# Patient Record
Sex: FEMALE | Race: WHITE | NOT HISPANIC OR LATINO
[De-identification: names, ages, dates, MRNs, and addresses within clinical notes are randomized per-mention and may not be internally consistent; named-entity substitution may affect disease eponyms.]

---

## 2019-04-08 ENCOUNTER — APPOINTMENT (OUTPATIENT)
Dept: OTOLARYNGOLOGY | Facility: CLINIC | Age: 54
End: 2019-04-08
Payer: COMMERCIAL

## 2019-04-08 DIAGNOSIS — Z82.49 FAMILY HISTORY OF ISCHEMIC HEART DISEASE AND OTHER DISEASES OF THE CIRCULATORY SYSTEM: ICD-10-CM

## 2019-04-08 DIAGNOSIS — M54.2 CERVICALGIA: ICD-10-CM

## 2019-04-08 PROCEDURE — 99213 OFFICE O/P EST LOW 20 MIN: CPT

## 2019-04-08 NOTE — HISTORY OF PRESENT ILLNESS
[de-identified] : She comes in today complaining of "swelling and neck, limiting neck range of motion, recurrent dizziness/vertigo".\par \par She visited me a few months ago with a classic BPPV. I sent her for vestibular rehabilitation and reposition maneuvers and she reports that she had a very good outcome.\par \par At this point she reports to me she's had several episodes of feeling significant posterior neck discomfort. This is associated with radiation of tingling down her arms and legs. She also developed shortness of breath. She feels that her throat is closing up.

## 2019-04-08 NOTE — ASSESSMENT
[FreeTextEntry1] : I explained to her that I believe she has primary musculoskeletal pathology. Associated with this is clear evidence of anxiety attacks. I also believe she has a migrainous component with this and she is also complaining of intermittent motion sickness while in a car.\par \par I have referred her for a primary care physician workup as well as to any physical medicine and rehabilitation Dr. triplett

## 2019-04-10 ENCOUNTER — APPOINTMENT (OUTPATIENT)
Dept: PHYSICAL MEDICINE AND REHAB | Facility: CLINIC | Age: 54
End: 2019-04-10
Payer: COMMERCIAL

## 2019-04-10 VITALS
WEIGHT: 175 LBS | BODY MASS INDEX: 29.88 KG/M2 | SYSTOLIC BLOOD PRESSURE: 129 MMHG | DIASTOLIC BLOOD PRESSURE: 92 MMHG | HEART RATE: 89 BPM | HEIGHT: 64 IN

## 2019-04-10 DIAGNOSIS — M54.2 CERVICALGIA: ICD-10-CM

## 2019-04-10 DIAGNOSIS — M79.9 SOFT TISSUE DISORDER, UNSPECIFIED: ICD-10-CM

## 2019-04-10 DIAGNOSIS — M62.838 OTHER MUSCLE SPASM: ICD-10-CM

## 2019-04-10 DIAGNOSIS — G89.29 CERVICALGIA: ICD-10-CM

## 2019-04-10 DIAGNOSIS — M25.512 PAIN IN LEFT SHOULDER: ICD-10-CM

## 2019-04-10 DIAGNOSIS — M43.6 TORTICOLLIS: ICD-10-CM

## 2019-04-10 PROCEDURE — 99244 OFF/OP CNSLTJ NEW/EST MOD 40: CPT

## 2019-04-10 NOTE — ASSESSMENT
[FreeTextEntry1] : \par PLAN AND RECOMMENDATIONS :\par \par We discussed differential diagnosis and clinical impression\par \par Recommend\par .symptomatic care and support\par  medications -try lidoderm  patches prescribed \par  imaging  deserves xrays c spine \par  referral to\par  hydrotherapy /heat / cold for pain\par  \par  relative rest and avoidance of painful activity where possible \par  \par  return for follow up after xray \par consider PT and TP injections \par we discussed posture -given hand out \par

## 2019-04-10 NOTE — HISTORY OF PRESENT ILLNESS
[FreeTextEntry1] : Ms. ANNALEE LOZANO is a very pleasant 54 year female who comes in for evaluation of neck pain that has been ongoing for >2 years  without any specific injury or inciting event ,however she believes she suffered a bad strain from a strap of bathing suit around her neck when on a trip in [place]  She has had vertigo last year and often wakes up with neck stiffness . She admits to a lot of family stress last few months The pain is located primarily post neck  intermittent in nature and described as stiff sharp -occasionally with dizzyness  . The pain is rated as 8/10 during today's visit, and ranges from 0-8/10.  Yesterday the pain was so severe she could not move her left arm  .The patient's symptoms are aggravated by stress and waking up/getting out of bed  and alleviated by ? nothing  . The patient has had radiating symptoms to the upper extremities but not recently , numbness/tingling, weakness, or bowel/bladder dysfunction. The patient has no other complaints at this time.\par

## 2019-04-10 NOTE — CONSULT LETTER
[FreeTextEntry1] : Dear Dr. Gay\par \par I had the pleasure of evaluating your patient, ANNALEE LOZANO .\par \par Thank you very much for allowing me to participate in the care of this patient. If you have any questions, please do not hesitate to contact me. \par \par Sincerely, \par Aurelio Chapa MD \par \par ABPMR Board Certified in Physical Medicine and Rehabilitation\par Certified Fellow of AANEM (Neuromuscular and Electrodiagnostic Medicine)\par Subspecialty certified in Sports Medicine (ABPMR)\par \par  of Physical Medicine and Rehabilitation\par [facility] [employer] [facility]\par [facility]\par \par

## 2019-04-10 NOTE — REVIEW OF SYSTEMS
[Joint Stiffness] : joint stiffness [Headache] : headache [Muscle Pain] : muscle pain [Dizziness] : dizziness [Anxiety] : anxiety [Negative] : Heme/Lymph

## 2019-06-25 ENCOUNTER — APPOINTMENT (OUTPATIENT)
Dept: OTOLARYNGOLOGY | Facility: CLINIC | Age: 54
End: 2019-06-25
Payer: COMMERCIAL

## 2019-06-25 DIAGNOSIS — H92.09 OTALGIA, UNSPECIFIED EAR: ICD-10-CM

## 2019-06-25 DIAGNOSIS — H90.3 SENSORINEURAL HEARING LOSS, BILATERAL: ICD-10-CM

## 2019-06-25 PROCEDURE — 99213 OFFICE O/P EST LOW 20 MIN: CPT

## 2019-06-25 PROCEDURE — 92567 TYMPANOMETRY: CPT

## 2019-06-25 PROCEDURE — 92557 COMPREHENSIVE HEARING TEST: CPT

## 2019-06-25 RX ORDER — LIDOCAINE 5% 5 G/100G
5 CREAM TOPICAL EVERY 8 HOURS
Qty: 2 | Refills: 1 | Status: COMPLETED | COMMUNITY
Start: 2019-04-10 | End: 2019-06-25

## 2019-06-25 NOTE — ASSESSMENT
[FreeTextEntry1] : New onset of right-sided unilateral idiopathic tinnitus. 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 A compare this to a recent audiogram and is stable.\par Because of the unilateral tinnitus and recent vestibular symptoms she will be sent for an MRI with contrast to vrule out a retro- cochlear lesion.

## 2019-06-25 NOTE — HISTORY OF PRESENT ILLNESS
[de-identified] : She comes in today complaining of right-sided tinnitus. This recently developed.\par She had seen me several months ago for vestibular symptoms. She benefited from repositioning maneuvers as well as vestibular rehabilitation.\par The vestibular symptoms have resolved.\par She does not report as change in hearing.

## 2019-07-14 ENCOUNTER — FORM ENCOUNTER (OUTPATIENT)
Age: 54
End: 2019-07-14

## 2019-07-15 ENCOUNTER — APPOINTMENT (OUTPATIENT)
Dept: MRI IMAGING | Facility: CLINIC | Age: 54
End: 2019-07-15

## 2019-07-15 ENCOUNTER — TRANSCRIPTION ENCOUNTER (OUTPATIENT)
Age: 54
End: 2019-07-15

## 2019-07-15 ENCOUNTER — OUTPATIENT (OUTPATIENT)
Dept: OUTPATIENT SERVICES | Facility: HOSPITAL | Age: 54
LOS: 1 days | End: 2019-07-15

## 2019-07-18 ENCOUNTER — APPOINTMENT (OUTPATIENT)
Dept: OTOLARYNGOLOGY | Facility: CLINIC | Age: 54
End: 2019-07-18
Payer: COMMERCIAL

## 2019-07-18 DIAGNOSIS — H93.11 TINNITUS, RIGHT EAR: ICD-10-CM

## 2019-07-18 PROCEDURE — 99213 OFFICE O/P EST LOW 20 MIN: CPT

## 2019-07-18 NOTE — ASSESSMENT
[FreeTextEntry1] : Idiopathic unilateral tinnitus. I explained her the pathophysiology of the problem. This is likely from a high-frequency hearing loss that is not being noticed in either E. or but in particular the right ear.\par I have asked her to avoid caffeine and alcohol. She will be cardiogram. We talk about the acceptance and potential biofeedback therapy. I gave her a copy of her MRI report.

## 2019-07-18 NOTE — HISTORY OF PRESENT ILLNESS
[de-identified] : She has had right-sided tinnitus. She also had a similar symptoms so as a result the center for an MRI to rule out a retrocochlear lesion. MRI was negative.\par She continues to complain of right-sided tinnitus.

## 2020-01-30 RX ORDER — AMOXICILLIN AND CLAVULANATE POTASSIUM 875; 125 MG/1; MG/1
875-125 TABLET, COATED ORAL
Qty: 20 | Refills: 1 | Status: ACTIVE | COMMUNITY
Start: 2020-01-30 | End: 1900-01-01

## 2021-03-20 ENCOUNTER — APPOINTMENT (OUTPATIENT)
Age: 56
End: 2021-03-20
Payer: COMMERCIAL

## 2021-03-20 PROCEDURE — 0001A: CPT

## 2021-03-22 ENCOUNTER — RESULT REVIEW (OUTPATIENT)
Age: 56
End: 2021-03-22

## 2021-03-29 ENCOUNTER — APPOINTMENT (OUTPATIENT)
Dept: RHEUMATOLOGY | Facility: CLINIC | Age: 56
End: 2021-03-29
Payer: COMMERCIAL

## 2021-03-29 ENCOUNTER — LABORATORY RESULT (OUTPATIENT)
Age: 56
End: 2021-03-29

## 2021-03-29 VITALS
SYSTOLIC BLOOD PRESSURE: 152 MMHG | OXYGEN SATURATION: 99 % | TEMPERATURE: 97.6 F | HEART RATE: 76 BPM | BODY MASS INDEX: 30.73 KG/M2 | HEIGHT: 64 IN | WEIGHT: 180 LBS | DIASTOLIC BLOOD PRESSURE: 87 MMHG

## 2021-03-29 DIAGNOSIS — M67.88 OTHER SPECIFIED DISORDERS OF SYNOVIUM AND TENDON, OTHER SITE: ICD-10-CM

## 2021-03-29 DIAGNOSIS — R70.0 ELEVATED ERYTHROCYTE SEDIMENTATION RATE: ICD-10-CM

## 2021-03-29 DIAGNOSIS — M76.60 ACHILLES TENDINITIS, UNSPECIFIED LEG: ICD-10-CM

## 2021-03-29 PROCEDURE — 36415 COLL VENOUS BLD VENIPUNCTURE: CPT

## 2021-03-29 PROCEDURE — 99072 ADDL SUPL MATRL&STAF TM PHE: CPT

## 2021-03-29 PROCEDURE — 99244 OFF/OP CNSLTJ NEW/EST MOD 40: CPT | Mod: 25

## 2021-03-30 LAB
CRP SERPL-MCNC: 5 MG/L
ERYTHROCYTE [SEDIMENTATION RATE] IN BLOOD BY WESTERGREN METHOD: 24 MM/HR
RHEUMATOID FACT SER QL: 16 IU/ML

## 2021-03-30 NOTE — CONSULT LETTER
[Dear  ___] : Dear  [unfilled], [Consult Letter:] : I had the pleasure of evaluating your patient, [unfilled]. [Please see my note below.] : Please see my note below. [Consult Closing:] : Thank you very much for allowing me to participate in the care of this patient.  If you have any questions, please do not hesitate to contact me. [Sincerely,] : Sincerely, [FreeTextEntry3] : Maggy Blandon MD, MPH

## 2021-03-30 NOTE — ASSESSMENT
[FreeTextEntry1] : 56 year old woman referred for rheumatology evaluation.  Patient noted to have elevated ESR, CRP and Rheumatoid factor in the setting of bilateral ankle pain secondary to Achilles tendinosis.  Patient feels tendinosis maybe related to quinolone antibiotic use and was previously following with orthopedist at [facility], pending PRP treatment prior to current coronavirus pandemic.  Patient will make appointment to follow up.,  At this time, it is unclear of current condition related to an underlying inflammatory arthropathy, although les s likely  Rheumatoid factor present in the past as well at about the same level.  Will repeat and check anti CCP as well.  Given tendinosis and involvement of the Achilles, will also check HLA B27, ESR, and CRP as well.  Given eye symptoms, will check anti SSa and Anti SSb, although given negative LENNOX less likely positive.  Will also check lyme antibody.  further management pending results.

## 2021-03-30 NOTE — PHYSICAL EXAM
[General Appearance - Alert] : alert [General Appearance - In No Acute Distress] : in no acute distress [General Appearance - Well Nourished] : well nourished [General Appearance - Well-Appearing] : healthy appearing [Sclera] : the sclera and conjunctiva were normal [Respiration, Rhythm And Depth] : normal respiratory rhythm and effort [Exaggerated Use Of Accessory Muscles For Inspiration] : no accessory muscle use [No Spinal Tenderness] : no spinal tenderness [Nail Clubbing] : no clubbing  or cyanosis of the fingernails [Skin Turgor] : normal skin turgor [] : no rash [Skin Lesions] : no skin lesions [Oriented To Time, Place, And Person] : oriented to person, place, and time [Impaired Insight] : insight and judgment were intact [Affect] : the affect was normal [FreeTextEntry1] : enlargement of the bilateral achilles, right more than left.  No active synovitis of the upper and lower extremities bilaterally.

## 2021-03-30 NOTE — HISTORY OF PRESENT ILLNESS
[FreeTextEntry1] : 56 year old woman referred for rheumatology evaluation\par PMD: Marylee Dilling\par \par Patient feels had a series of medical issues which are unclear if related.\par Was seen by PMD and had blood tests completed, some numbers slightly elevated\par \par RF 20\par ESR 35\par CRP 6\par LENNOX neg\par \par Patient reports about 12 years ago, after wine and sushi\par Woke up in the middle of the night, felt as though everything hurting and swollen\par Everything hurt when tried to step up out of the bed\par Everything was swollen lower extremities and hands, hard to move and joints felt as though popping\par Went into the doctor same day for evaluation, labs with elevated RF, ESR, CRP\par Treated with steroids at that time and symptoms resolved.  \par \par In September 2017 after summer vacation in [place]\par Developed a sinus infection, treated with levaquin\par Ankles started hurting initially thought plantar fasciitis\par Had positive RF of 20 in past\par In November 2017 had additional sinus infection, seen by ENT, treated with abx and steroids\par Also with bilateral ankle and achilles pain, went to foot and ankle, referred to PT \par Saw specialist at [facility], possible related to levaquin\par Recommended trial of PRP but due to covid postponed. \par \par also developed Vertigo about two years ago Bppv treated an improved with vestibular therapy, \par Vertigo turned into tinnitus\par \par Eye exam pressures elevated in eyes\par No history of glaucoma\par Mother with cataracts\par Referred to specialist for glaucoma specialist, "cornea are thick" false pressure of glaucoma\par at risk for narrow angle\par \par No rashes, no history of psoriasis\par No oral ulcers\par No low back pain\par no morning stiffness except in bilateral ankles\par +weight gain as decreased exercise\par Foot pain is quite limiting, limits daily activities, unable to walk as previously.\par Had multiple MRI by orthopedist\par \par MRI 8/2019 right foot\par chronic achilles tendinosis without tear\par mild posterior tibial tenosynovitis and insertional tendinosis without tear\par Mild proximal central cord fasciopathy\par Similar to 2018 \par \par MRI left foot 8/2019\par Chronic achilles tendinosis without tear\par mild posterior tibial tenosynovitis \par Mild proximal central cord fasciopathy\par Similar to 2018

## 2021-03-31 LAB
CCP AB SER IA-ACNC: <8 UNITS
ENA SS-A AB SER IA-ACNC: <0.2 AL
ENA SS-B AB SER IA-ACNC: <0.2 AL
RF+CCP IGG SER-IMP: NEGATIVE

## 2021-04-01 LAB — B BURGDOR IGG+IGM SER QL IB: NORMAL

## 2021-04-05 LAB — HLA-B27 RELATED AG QL: NEGATIVE

## 2021-05-26 ENCOUNTER — APPOINTMENT (OUTPATIENT)
Dept: CT IMAGING | Facility: CLINIC | Age: 56
End: 2021-05-26
Payer: SELF-PAY

## 2021-05-26 PROCEDURE — 75571 CT HRT W/O DYE W/CA TEST: CPT | Mod: 26

## 2021-11-16 ENCOUNTER — APPOINTMENT (OUTPATIENT)
Dept: ORTHOPEDIC SURGERY | Facility: CLINIC | Age: 56
End: 2021-11-16
Payer: COMMERCIAL

## 2021-11-16 VITALS — BODY MASS INDEX: 31.2 KG/M2 | WEIGHT: 185 LBS | HEIGHT: 64.5 IN

## 2021-11-16 DIAGNOSIS — M23.90 UNSPECIFIED INTERNAL DERANGEMENT OF UNSPECIFIED KNEE: ICD-10-CM

## 2021-11-16 DIAGNOSIS — M25.562 PAIN IN LEFT KNEE: ICD-10-CM

## 2021-11-16 PROCEDURE — 99204 OFFICE O/P NEW MOD 45 MIN: CPT

## 2021-11-16 PROCEDURE — 73562 X-RAY EXAM OF KNEE 3: CPT | Mod: LT

## 2021-11-16 RX ORDER — NABUMETONE 500 MG/1
500 TABLET, FILM COATED ORAL
Qty: 30 | Refills: 2 | Status: ACTIVE | COMMUNITY
Start: 2021-11-16 | End: 1900-01-01

## 2021-11-16 NOTE — PHYSICAL EXAM
[de-identified] : LEFT knee\par \par Constitutional: \par The patient is healthy-appearing and in no apparent distress. \par \par Gait:\par The patient ambulates with a normal gait and no significant limp.\par \par Cardiovascular System: \par The capillary refill is less than 2 seconds. \par \par Skin: \par There are no skin abnormalities.\par \par LEFT Knee:\par  \par Bony Palpation: \par There is TENDERNESS of the medial joint line. \par There is no tenderness of the lateral joint line.\par There is no tenderness of the medial femoral chondyle.\par There is no tenderness of the lateral femoral chondyle.\par There is no tenderness of the tibial tubercle.\par There is no tenderness of the superior patella.\par There is no tenderness of the inferior patella.\par There is no tenderness of the medial patellar facet.\par There is no tenderness of the lateral patellar facet.\par \par Soft Tissue Palpation: \par There is no tenderness of the medial retinaculum.\par There is no tenderness of the lateral retinaculum.\par There is no tenderness of the quadriceps tendon.\par There is no tenderness of the patella tendon.\par There is no tenderness of the ITB.\par There is no tenderness of the pes anserine.\par \par Active Range of Motion: \par The range of motion at the knee actively and passively is full. \par \par Special Tests: \par There is a POSITIVE Apley.\par There is a POSITIVE Steinmanns. \par There is a negative Lachman and Anterior Drawer.\par There is a negative Posterior Drawer.  \par There is no varus or valgus laxity.\par \par Strength: \par There is 5/5 hip flexion and 5/5 knee flexion and extension.  \par \par Psychiatric: \par The patient demonstrates a normal mood and affect and is active and alert.\par Bilateral ankles:\par \par Cardiovascular System: \par The capillary refill is less than 2 seconds. \par \par Skin: \par There are no skin abnormalities of ankle other than fusiform thickening of the Achilles.\par \par Ankles and Feet: \par Inspection: \par There is no erythema.\par There is no induration.\par There is no warmth.\par There is no swelling. \par \par Bony Palpation: \par There is no tenderness of the calcaneal tuberosity.\par There is no tenderness of the metatarsals.\par There is no tenderness of the tarsometatarsal joints\par There is no tenderness of the navicular tuberosity. \par There is no tenderness of the dome of talus.\par There is no tenderness of the head of talus.\par There is no tenderness of the inferior tibiofibular joint.\par \par Soft Tissue Palpation: \par There is no tenderness of the tibialis posterior.\par There is no tenderness of the tibialis anterior. \par There is no tenderness of the plantar fascia.\par There is TENDERNESS of the Achilles tendon.\par There is no tenderness of the extensor hallucis longus.\par There is no tenderness of the sinus tarsi. \par There is no tenderness of the peroneus longus and brevis.\par There is no tenderness of the deltoid ligament.   \par There is no tenderness of the anterior talofibular ligament and the calcaneofibular ligament. \par \par Active Range of Motion: \par The range of motion at the ankle is full. \par \par Stability: \par The anterior drawer is negative. \par \par Strength: \par There is 5/5 ankle plantarflexion and dorsiflexion.\par \par Neurological System: \par There is normal sensation to light touch at the ankle and foot. \par \par  [de-identified] : Given patient's reported history and physical examination, x-ray evaluation ( as listed below ) was ordered and performed to aid in diagnosis and treatment of the patient.\par X-ray left knee.  There is no significant bony / soft tissue abnormality, arthritis, or fracture.\par

## 2021-11-16 NOTE — HISTORY OF PRESENT ILLNESS
[de-identified] : Location: Left knee- medial\par Duration: 2 weeks\par Context: atraumatic\par Quality: stabbing, sharp, stiffness\par Aggravating factors: bending, lying down, squatting, standing up after sitting for a long time, pain worse in the morning\par Associated symptoms: clicking\par Conservative treatment: ice, heat, Advil, brace\par Patient has been seen by Rheumatology in past. Patient also complains of chronic bilateral Achilles tendinitis in the past reports MRI evaluation and evaluation with consideration to surgical debridement.  Patient states she has not done any recent type of physical therapy and is not doing any type of home exercises

## 2021-11-16 NOTE — ASSESSMENT
[FreeTextEntry1] : Lengthy discussion with the patient for greater than 30 minutes after the history and exam in regards to symptoms at the knee and imaging and concern for possible meniscus tear.  Recommendation at this time for MRI evaluation given her pain and exam as well as x-rays.  In addition discuss the fusiform Achilles tendinitis and MRI reports consistent with Achilles tendinitis and recommendation at this time for stretching with consideration to a heel insert.  Discussed its failure of nonoperative is provided relief ultimately consideration to a PRP injection but I normally reserved for this one all nonoperative treatments have failed to provide relief consideration to surgery.  If no improvement of nonoperative consideration to surgical treatment in regards the Achilles tendinitis discussed with patient in with surgery they're always be some swelling in that area and the scar from the surgery.

## 2022-05-06 RX ORDER — DOXYCYCLINE 100 MG/1
100 CAPSULE ORAL TWICE DAILY
Qty: 20 | Refills: 0 | Status: ACTIVE | COMMUNITY
Start: 2022-05-06 | End: 1900-01-01

## 2022-11-29 ENCOUNTER — RESULT REVIEW (OUTPATIENT)
Age: 57
End: 2022-11-29

## 2023-04-18 ENCOUNTER — APPOINTMENT (OUTPATIENT)
Dept: OPHTHALMOLOGY | Facility: CLINIC | Age: 58
End: 2023-04-18

## 2023-04-18 ENCOUNTER — NON-APPOINTMENT (OUTPATIENT)
Age: 58
End: 2023-04-18

## 2023-08-28 ENCOUNTER — APPOINTMENT (OUTPATIENT)
Dept: OTOLARYNGOLOGY | Facility: CLINIC | Age: 58
End: 2023-08-28
Payer: COMMERCIAL

## 2023-08-28 DIAGNOSIS — R22.1 LOCALIZED SWELLING, MASS AND LUMP, NECK: ICD-10-CM

## 2023-08-28 PROCEDURE — 99213 OFFICE O/P EST LOW 20 MIN: CPT

## 2023-08-28 NOTE — ASSESSMENT
[FreeTextEntry1] : I explained the natural history of ptosis of submandibular glands and loss of strength the platysma and ptosis. No further work-up or treatment required.

## 2023-08-28 NOTE — HISTORY OF PRESENT ILLNESS
[de-identified] : She presents for evaluation for concern of a neck mass. She reports that she is using invisible line. When she clenches her teeth she noticed something in her left neck under her jaw. She then palpated something more lateral in her back. She does not report any other head neck complaints

## 2023-09-12 RX ORDER — AMOXICILLIN AND CLAVULANATE POTASSIUM 875; 125 MG/1; MG/1
875-125 TABLET, COATED ORAL
Qty: 20 | Refills: 1 | Status: ACTIVE | COMMUNITY
Start: 2023-09-12 | End: 1900-01-01

## 2024-01-25 RX ORDER — AMOXICILLIN AND CLAVULANATE POTASSIUM 875; 125 MG/1; MG/1
875-125 TABLET, COATED ORAL
Qty: 20 | Refills: 0 | Status: ACTIVE | COMMUNITY
Start: 2024-01-25 | End: 1900-01-01

## 2024-01-30 ENCOUNTER — APPOINTMENT (OUTPATIENT)
Dept: OTOLARYNGOLOGY | Facility: CLINIC | Age: 59
End: 2024-01-30
Payer: COMMERCIAL

## 2024-01-30 DIAGNOSIS — J01.41 ACUTE RECURRENT PANSINUSITIS: ICD-10-CM

## 2024-01-30 DIAGNOSIS — H69.91 UNSPECIFIED EUSTACHIAN TUBE DISORDER, RIGHT EAR: ICD-10-CM

## 2024-01-30 PROCEDURE — 99213 OFFICE O/P EST LOW 20 MIN: CPT | Mod: 25

## 2024-01-30 PROCEDURE — 31231 NASAL ENDOSCOPY DX: CPT

## 2024-01-30 RX ORDER — PREDNISONE 20 MG/1
20 TABLET ORAL DAILY
Qty: 5 | Refills: 0 | Status: ACTIVE | COMMUNITY
Start: 2024-01-30 | End: 1900-01-01

## 2024-02-01 PROBLEM — H69.91 DYSFUNCTION OF RIGHT EUSTACHIAN TUBE: Status: ACTIVE | Noted: 2024-02-01

## 2024-02-01 PROBLEM — J01.41 ACUTE RECURRENT PANSINUSITIS: Status: ACTIVE | Noted: 2024-02-01

## 2024-02-01 NOTE — HISTORY OF PRESENT ILLNESS
[de-identified] : She called the office last week complaining of sinus symptoms and right ear pain. Was started on Augmentin and reports feeling 50% improved. She continues to complain of nasal congestion, facial pressure bilaterally as well as right-sided eustachian tube symptoms. no

## 2024-02-01 NOTE — PROCEDURE
[FreeTextEntry6] : PROCEDURE NOTES  PROCEDURE: Nasal endoscopy  SURGEON: Dr. Dejesus INDICATIONS: Assess for chronic sinusitis.  ANESTHESIA: The patient was placed in a sitting position.  Following application of the topical anesthetic and decongestant, exam was performed with a zero degree endoscope.  The scope was passed along the right nasal floor to the nasopharynx.  It was then passed into the region of the middle meatus, middle turbinate, and sphenoethmoid region.  An identical procedure was performed on the left side.  The following findings were noted:  The nasal mucosa was healthy appearing and the septum known history of a septal perforation.  The middle meatus and sphenoethmoid recesses were clear bilaterally. The Superior meatus was without lesion or infection.  The Inferior, Middle and Superior Turbinates were not enlarged and healthy in appearance.  There was not pus, polyps or mass.  The nasopharynx was normal.

## 2024-02-01 NOTE — ASSESSMENT
[FreeTextEntry1] : Improving but persistent symptoms. Will use 3 days of Afrin. Will finish her antibiotics. Will give her a low burst of prednisone. Follow-up on an as-needed basis.

## 2024-02-12 ENCOUNTER — APPOINTMENT (OUTPATIENT)
Dept: OTOLARYNGOLOGY | Facility: CLINIC | Age: 59
End: 2024-02-12
Payer: COMMERCIAL

## 2024-02-12 DIAGNOSIS — J34.89 OTHER SPECIFIED DISORDERS OF NOSE AND NASAL SINUSES: ICD-10-CM

## 2024-02-12 PROCEDURE — 99213 OFFICE O/P EST LOW 20 MIN: CPT

## 2024-02-12 NOTE — ASSESSMENT
[FreeTextEntry1] : At this point my differential diagnosis includes but is not limited to an ongoing sinusitis versus a primary neurologic headache syndrome. I explained this to her in detail. At this point my recommendation is to give the problem some time.  I did explain to her if the problem were to progress or at some point not improve I would recommend imaging of her paranasal sinuses.

## 2024-02-12 NOTE — HISTORY OF PRESENT ILLNESS
[de-identified] : She comes back in follow-up.  She finished Augmentin and took the burst of oral steroid. She reports that she is feeling better but she continues to complain of pressure in her mid face as well as postnasal drip. She does note that she has been under a great deal of stress in her life.

## 2025-05-29 ENCOUNTER — NON-APPOINTMENT (OUTPATIENT)
Age: 60
End: 2025-05-29

## 2025-05-29 ENCOUNTER — APPOINTMENT (OUTPATIENT)
Dept: OTOLARYNGOLOGY | Facility: CLINIC | Age: 60
End: 2025-05-29
Payer: COMMERCIAL

## 2025-05-29 DIAGNOSIS — H69.91 UNSPECIFIED EUSTACHIAN TUBE DISORDER, RIGHT EAR: ICD-10-CM

## 2025-05-29 DIAGNOSIS — J01.41 ACUTE RECURRENT PANSINUSITIS: ICD-10-CM

## 2025-05-29 PROCEDURE — 99213 OFFICE O/P EST LOW 20 MIN: CPT | Mod: 25

## 2025-05-29 PROCEDURE — 31231 NASAL ENDOSCOPY DX: CPT

## 2025-05-30 PROBLEM — H69.91 DYSFUNCTION OF RIGHT EUSTACHIAN TUBE: Status: ACTIVE | Noted: 2025-05-30

## 2025-05-30 PROBLEM — H69.91 DYSFUNCTION OF RIGHT EUSTACHIAN TUBE: Status: RESOLVED | Noted: 2024-02-01 | Resolved: 2025-05-30
